# Patient Record
Sex: FEMALE | Race: WHITE | NOT HISPANIC OR LATINO | URBAN - METROPOLITAN AREA
[De-identification: names, ages, dates, MRNs, and addresses within clinical notes are randomized per-mention and may not be internally consistent; named-entity substitution may affect disease eponyms.]

---

## 2023-11-06 ENCOUNTER — PHARMACY VISIT (OUTPATIENT)
Dept: PHARMACY | Facility: CLINIC | Age: 68
End: 2023-11-06
Payer: COMMERCIAL

## 2023-11-06 PROCEDURE — RXMED WILLOW AMBULATORY MEDICATION CHARGE

## 2025-04-03 ENCOUNTER — OFFICE VISIT (OUTPATIENT)
Dept: OTOLARYNGOLOGY | Facility: CLINIC | Age: 70
End: 2025-04-03
Payer: MEDICAID

## 2025-04-03 DIAGNOSIS — H60.312 ACUTE DIFFUSE OTITIS EXTERNA OF LEFT EAR: ICD-10-CM

## 2025-04-03 DIAGNOSIS — H61.23 BILATERAL IMPACTED CERUMEN: Primary | ICD-10-CM

## 2025-04-03 PROBLEM — H61.21 IMPACTED CERUMEN OF RIGHT EAR: Status: ACTIVE | Noted: 2025-04-03

## 2025-04-03 PROCEDURE — 69210 REMOVE IMPACTED EAR WAX UNI: CPT

## 2025-04-03 PROCEDURE — 1159F MED LIST DOCD IN RCRD: CPT

## 2025-04-03 PROCEDURE — 1160F RVW MEDS BY RX/DR IN RCRD: CPT

## 2025-04-03 RX ORDER — CIPROFLOXACIN AND DEXAMETHASONE 3; 1 MG/ML; MG/ML
4 SUSPENSION/ DROPS AURICULAR (OTIC) 2 TIMES DAILY
Qty: 7.5 ML | Refills: 0 | Status: SHIPPED | OUTPATIENT
Start: 2025-04-03 | End: 2025-04-04 | Stop reason: SINTOL

## 2025-04-03 NOTE — PROGRESS NOTES
Subjective   Patient ID: Julieth Youngblood is a 69 y.o. female who presents for No chief complaint on file.    HPI  Patient returns, being seen for evaluation of ears. History of cerumen impaction, feels that right ear is full of wax today. Left ear is painful, itchy. Denies otorrhea, tinnitus, decreased hearing, dizziness, vertigo. All remaining head neck inquiry otherwise negative.     Physical Exam  Ears:  The external ear structures appear normal. The ear canals were impacted with cerumen bilaterally, which was removed today. Following cerumen removal, tympanic membranes are intact without evidence of air-fluid levels, retraction, or congenital defects.  Left ear with purulent drainage and dried skin as well.     Procedure:  Bilateral impacted cerumen removed under otomicroscopic examination using suction, curette, and forceps. Patient tolerated procedure without difficulty. Following removal, TMs are intact with no sign of infection, effusion, retraction, or perforation.     Assessment/Plan   Patient presents for evaluation of cerumen impaction. The ears were cleaned using otomicroscope and instrumentation.  Patient tolerated the procedure well. All questions were answered to patient's satisfaction. Will prescribe course of Ciprodex and have patient follow-up in 2 weeks.

## 2025-04-04 ENCOUNTER — TELEPHONE (OUTPATIENT)
Dept: OTOLARYNGOLOGY | Facility: CLINIC | Age: 70
End: 2025-04-04
Payer: MEDICARE

## 2025-04-04 RX ORDER — OFLOXACIN 3 MG/ML
4 SOLUTION AURICULAR (OTIC) 2 TIMES DAILY
Qty: 0.4 ML | Refills: 0 | Status: SHIPPED | OUTPATIENT
Start: 2025-04-04 | End: 2025-04-14

## 2025-04-04 NOTE — TELEPHONE ENCOUNTER
Patients  called and said wife was seen yesterday and was prescribed Cipro but could not  from pharmacy because she is allergic. Is there another script that you can send her ? Please advise. Thank you.

## 2025-04-17 ENCOUNTER — APPOINTMENT (OUTPATIENT)
Dept: OTOLARYNGOLOGY | Facility: CLINIC | Age: 70
End: 2025-04-17
Payer: MEDICARE

## 2025-04-17 DIAGNOSIS — H60.312 ACUTE DIFFUSE OTITIS EXTERNA OF LEFT EAR: Primary | ICD-10-CM

## 2025-04-17 PROCEDURE — 1160F RVW MEDS BY RX/DR IN RCRD: CPT

## 2025-04-17 PROCEDURE — 1159F MED LIST DOCD IN RCRD: CPT

## 2025-04-17 PROCEDURE — 99212 OFFICE O/P EST SF 10 MIN: CPT

## 2025-04-17 RX ORDER — OFLOXACIN 3 MG/ML
4 SOLUTION AURICULAR (OTIC) 2 TIMES DAILY
Qty: 10 ML | Refills: 0 | Status: SHIPPED | OUTPATIENT
Start: 2025-04-17

## 2025-04-17 NOTE — PROGRESS NOTES
Subjective   Patient ID: Julieth Youngblood is a 69 y.o. female who presents for No chief complaint on file.    HPI  Patient returns, being seen for follow-up on ears. Last seen 2 weeks ago for cerumen impaction, at which time she was also noted to have an acute otitis externa of left ear. Reports improvement in itching in that ear, has been using Ofloxacin drops as prescribed. All remaining head neck inquiry otherwise negative.     Physical Exam  Ears:  The external ear structures appear normal. The ear canals patent and the tympanic membranes are intact without evidence of air-fluid levels, retraction, or congenital defects. Left ear with minimal dried skin present, though no evidence of drainage or infection.     Assessment/Plan   Acute otitis externa of left ear, now resolved. Will send refill on Ofloxacin prescription for patient, recommend that she return to the office if symptoms recur.